# Patient Record
Sex: MALE | URBAN - METROPOLITAN AREA
[De-identification: names, ages, dates, MRNs, and addresses within clinical notes are randomized per-mention and may not be internally consistent; named-entity substitution may affect disease eponyms.]

---

## 2019-11-12 ENCOUNTER — APPOINTMENT (OUTPATIENT)
Dept: URBAN - METROPOLITAN AREA SURGERY 9 | Age: 79
Setting detail: DERMATOLOGY
End: 2019-11-12

## 2019-11-12 VITALS — RESPIRATION RATE: 16 BRPM | HEART RATE: 56 BPM | SYSTOLIC BLOOD PRESSURE: 110 MMHG | DIASTOLIC BLOOD PRESSURE: 70 MMHG

## 2019-11-12 PROBLEM — I25.10 ATHEROSCLEROTIC HEART DISEASE OF NATIVE CORONARY ARTERY WITHOUT ANGINA PECTORIS: Status: ACTIVE | Noted: 2019-11-12

## 2019-11-12 PROBLEM — C44.41 BASAL CELL CARCINOMA OF SKIN OF SCALP AND NECK: Status: ACTIVE | Noted: 2019-11-12

## 2019-11-12 PROBLEM — I10 ESSENTIAL (PRIMARY) HYPERTENSION: Status: ACTIVE | Noted: 2019-11-12

## 2019-11-12 PROBLEM — M12.9 ARTHROPATHY, UNSPECIFIED: Status: ACTIVE | Noted: 2019-11-12

## 2019-11-12 PROCEDURE — OTHER PRESCRIPTION: OTHER

## 2019-11-12 PROCEDURE — 17311 MOHS 1 STAGE H/N/HF/G: CPT

## 2019-11-12 PROCEDURE — OTHER RETURN TO REFERRING PROVIDER: OTHER

## 2019-11-12 PROCEDURE — OTHER MOHS SURGERY: OTHER

## 2019-11-12 PROCEDURE — 17312 MOHS ADDL STAGE: CPT

## 2019-11-12 PROCEDURE — OTHER CONSULTATION FOR MOHS SURGERY: OTHER

## 2019-11-12 RX ORDER — CEPHALEXIN 500 MG/1
CAPSULE ORAL
Qty: 21 | Refills: 0 | Status: ERX | COMMUNITY
Start: 2019-11-12

## 2019-11-12 NOTE — PROCEDURE: CONSULTATION FOR MOHS SURGERY
Detail Level: Detailed
X Size Of Lesion In Cm (Optional): 0
Body Location Override (Optional - Billing Will Still Be Based On Selected Body Map Location If Applicable): left lateral neck
Incorporate Mauc In Note: Yes

## 2021-12-02 NOTE — PROCEDURE: MOHS SURGERY
5 Ear Wedge Repair Text: A wedge excision was completed by carrying down an excision through the full thickness of the ear and cartilage with an inward facing Burow's triangle. The wound was then closed in a layered fashion.